# Patient Record
Sex: FEMALE | Race: WHITE | NOT HISPANIC OR LATINO | URBAN - METROPOLITAN AREA
[De-identification: names, ages, dates, MRNs, and addresses within clinical notes are randomized per-mention and may not be internally consistent; named-entity substitution may affect disease eponyms.]

---

## 2022-11-28 ENCOUNTER — OFFICE (OUTPATIENT)
Dept: URBAN - METROPOLITAN AREA CLINIC 72 | Facility: CLINIC | Age: 22
End: 2022-11-28

## 2022-11-28 VITALS
WEIGHT: 133 LBS | OXYGEN SATURATION: 99 % | SYSTOLIC BLOOD PRESSURE: 120 MMHG | HEART RATE: 96 BPM | DIASTOLIC BLOOD PRESSURE: 84 MMHG | HEIGHT: 67 IN

## 2022-11-28 DIAGNOSIS — K58.1 IRRITABLE BOWEL SYNDROME WITH CONSTIPATION: ICD-10-CM

## 2022-11-28 DIAGNOSIS — R11.2 NAUSEA WITH VOMITING, UNSPECIFIED: ICD-10-CM

## 2022-11-28 DIAGNOSIS — R10.9 UNSPECIFIED ABDOMINAL PAIN: ICD-10-CM

## 2022-11-28 PROCEDURE — 99204 OFFICE O/P NEW MOD 45 MIN: CPT | Performed by: INTERNAL MEDICINE

## 2022-11-28 RX ORDER — ONDANSETRON 4 MG/1
12 TABLET, ORALLY DISINTEGRATING ORAL
Qty: 50 | Refills: 1 | Status: ACTIVE
Start: 2022-11-28

## 2022-11-28 RX ORDER — PANTOPRAZOLE SODIUM 40 MG/1
40 TABLET, DELAYED RELEASE ORAL
Qty: 90 | Refills: 3 | Status: ACTIVE
Start: 2022-11-28

## 2022-11-28 NOTE — SERVICEHPINOTES
Nancy Joseph   is seen for an initial visit today.  
br
elham   For the last year she has been having a lot of abdominal pain, diarrhea, constipation and vomiting but recently pain and vomiting is worse and there is more cosntipation then diarrhea. 
br
elham She had an upper endoscopy that was normal and biopsy of stomach and normal biopsy of small bowel. br
elham  Her pain is mostly in the lower abdomen .  br
elham
br She is on metoclopromide 1-3 tmes a day, zofran as needed and hyosyamine.  The hysyocyamine is not helping at all.  The zofran and metochlopromide helps the nausea.  
br
elham She doesn't take anything for constipation.  Her last BM was 4-5 days ago.  
br She does not smoke marijuana. Noemy nurse has reviewed and updated the medication list with the patient. I have reviewed the medication list along with the documented medical, social and family history. Pertinent details are also noted above in the HPI.
elham lizarraga

## 2022-11-28 NOTE — SERVICENOTES
Our goal is to partner with you to improve your health and well being. It is important for you to complete necessary testing and follow the instructions given to you at your clinic visit. Our office will call you within 2 weeks with results of any testing but you may also call sooner to obtain results - [phone].   If you have any questions or concerns please feel free to call us.  We take your care very seriously and we thank you for your trust!
- stool studies, if positive then colonoscopy
- we will get records from [facility] (EGD, path, US and HIDA) to scan in
- start daily miralax 1 dose a day, hold for loose stool
- , start pantoprazole 40 mg once daily 30 min before first meal of the day
- watch for signs/symptoms of tardive dyskinesia which is muscle spasm and lip smaking if you have any concerning symptoms then stop the metoclopromide
- zofran as needed for nasuea

## 2023-01-26 ENCOUNTER — OFFICE (OUTPATIENT)
Dept: URBAN - METROPOLITAN AREA CLINIC 72 | Facility: CLINIC | Age: 23
End: 2023-01-26

## 2023-01-26 VITALS
OXYGEN SATURATION: 98 % | HEIGHT: 67 IN | HEART RATE: 90 BPM | SYSTOLIC BLOOD PRESSURE: 122 MMHG | DIASTOLIC BLOOD PRESSURE: 80 MMHG | WEIGHT: 134 LBS

## 2023-01-26 DIAGNOSIS — R12 HEARTBURN: ICD-10-CM

## 2023-01-26 DIAGNOSIS — K59.00 CONSTIPATION, UNSPECIFIED: ICD-10-CM

## 2023-01-26 DIAGNOSIS — R11.2 NAUSEA WITH VOMITING, UNSPECIFIED: ICD-10-CM

## 2023-01-26 DIAGNOSIS — R10.9 UNSPECIFIED ABDOMINAL PAIN: ICD-10-CM

## 2023-01-26 PROCEDURE — 99214 OFFICE O/P EST MOD 30 MIN: CPT | Performed by: NURSE PRACTITIONER

## 2023-01-26 RX ORDER — POLYETHYLENE GLYCOL 3350, SODIUM SULFATE, SODIUM CHLORIDE, POTASSIUM CHLORIDE, ASCORBIC ACID, SODIUM ASCORBATE 140-9-5.2G
KIT ORAL
Qty: 1 | Refills: 0 | Status: ACTIVE
Start: 2023-01-26

## 2023-01-26 RX ORDER — PROMETHAZINE HYDROCHLORIDE 12.5 MG/1
TABLET ORAL
Qty: 42 | Refills: 0 | Status: ACTIVE
Start: 2023-01-26

## 2023-01-26 NOTE — SERVICENOTES
- Will schedule colonoscopy with Dr. Felipe
- Will send order over for CT, if you have not heard from radiology center by Monday let us know
- Phenergan sent in to pharmacy
- Will consider Gastric Emptying Study after colonoscopy and EGD

## 2023-01-26 NOTE — SERVICEHPINOTES
Nancy Joseph   is seen today for a follow-up visit.     1/26/2022br11/28/2022 Initial Visit with Dr. Felipe Olya Joseph is seen for an initial visit today. For the last year she has been having a lot of abdominal pain, diarrhea, constipation and vomiting but recently pain and vomiting is worse and there is more constipation then diarrhea. She had an upper endoscopy that was normal and biopsy of stomach and normal biopsy of small bowel. Her pain is mostly in the lower abdomen. She is on metoclopromide 1-3 tmes a day, zofran as needed and hyosyamine. The hysyocyamine is not helping at all. The zofran and metochlopromide helps the nausea. She doesn't take anything for constipation. Her last BM was 4-5 days ago. brShe does not smoke marijuana. brPlan 11/28/2022 brFC, labs and would consider CT vs. colonoscopy. Zofran and Pantoprazole prescribed.
br Today 1/26/2022 
br Ms. Joseph is here for a follow up with continued symptoms. She states that her vomiting and constipation have increased since her last visit. She is having to take 12mg of Zofran to help with nausea. The nausea comes out of no where and not related to eating. When she has vomiting, she will have several episodes of vomiting but then the nausea will eventually resolve after vomiting. Her appetite has decreased due to the nausea. 
br 
br Her abdominal pain is a little better, but when it comes it is lower across the abdomen. The abdominal pain comes in ways. She is still having constipation. She last had a bowel movement last night, but very small. Before last night it had been 4-5 days. She denies any blood or mucus in her stool. She does reports that in the past she has been blood (two episodes only) on toilet paper when she wiped. Last time this happened was over 2 weeks ago. 
br
br Currently she is not taking the reglan. She has not taken since the last visit due to possible side effects. She is taking Zofran, pantoprazole. She thinks the pantoprazole is helping some. No dysphagia. 
br
br No marijuana use and last menstrual cycle was last week.  br brLabs: br1/4/2023 Fecal Ahsan- 81 br11/28/2022 CBC, CMP, Celiac, Sed Rate, CRP- normal br

## 2023-03-13 ENCOUNTER — AMBULATORY SURGICAL CENTER (OUTPATIENT)
Dept: URBAN - METROPOLITAN AREA SURGERY 19 | Facility: SURGERY | Age: 23
End: 2023-03-13

## 2023-03-13 ENCOUNTER — OFFICE (OUTPATIENT)
Dept: URBAN - METROPOLITAN AREA PATHOLOGY 24 | Facility: PATHOLOGY | Age: 23
End: 2023-03-13

## 2023-03-13 DIAGNOSIS — R19.4 CHANGE IN BOWEL HABIT: ICD-10-CM

## 2023-03-13 DIAGNOSIS — K62.89 OTHER SPECIFIED DISEASES OF ANUS AND RECTUM: ICD-10-CM

## 2023-03-13 LAB
COLONOSCOPY STUDY: (no result)
COLONOSCOPY STUDY: (no result)

## 2023-03-13 PROCEDURE — 88305 TISSUE EXAM BY PATHOLOGIST: CPT | Performed by: PATHOLOGY

## 2023-03-13 PROCEDURE — 45380 COLONOSCOPY AND BIOPSY: CPT | Performed by: INTERNAL MEDICINE

## 2023-04-06 ENCOUNTER — OFFICE (OUTPATIENT)
Dept: URBAN - METROPOLITAN AREA CLINIC 72 | Facility: CLINIC | Age: 23
End: 2023-04-06

## 2023-04-06 VITALS
SYSTOLIC BLOOD PRESSURE: 102 MMHG | HEIGHT: 67 IN | OXYGEN SATURATION: 95 % | HEART RATE: 74 BPM | WEIGHT: 136 LBS | DIASTOLIC BLOOD PRESSURE: 80 MMHG

## 2023-04-06 DIAGNOSIS — R10.9 UNSPECIFIED ABDOMINAL PAIN: ICD-10-CM

## 2023-04-06 DIAGNOSIS — R12 HEARTBURN: ICD-10-CM

## 2023-04-06 DIAGNOSIS — R11.2 NAUSEA WITH VOMITING, UNSPECIFIED: ICD-10-CM

## 2023-04-06 DIAGNOSIS — K59.00 CONSTIPATION, UNSPECIFIED: ICD-10-CM

## 2023-04-06 PROCEDURE — 99214 OFFICE O/P EST MOD 30 MIN: CPT | Performed by: NURSE PRACTITIONER

## 2023-04-06 RX ORDER — LINACLOTIDE 72 UG/1
CAPSULE, GELATIN COATED ORAL
Qty: 30 | Refills: 0 | Status: ACTIVE

## 2023-04-06 NOTE — SERVICEHPINOTES
Nancy Joseph   is seen today for a follow-up visit.     
11/28/2022 Initial Visit with Dr. Felipe Olya Joseph is seen for an initial visit today. For the last year she has been having a lot of abdominal pain, diarrhea, constipation and vomiting but recently pain and vomiting is worse and there is more constipation then diarrhea. She had an upper endoscopy that was normal and biopsy of stomach and normal biopsy of small bowel. Her pain is mostly in the lower abdomen. She is on metoclopromide 1-3 tmes a day, zofran as needed and hyosyamine. The hysyocyamine is not helping at all. The zofran and metochlopromide helps the nausea. She doesn't take anything for constipation. Her last BM was 4-5 days ago. brShe does not smoke marijuana. br11/28/2022 Plan: brFC, labs and would consider CT vs. colonoscopy. Zofran and Pantoprazole prescribed. br1/26/2022 Interval History: Vale Joseph is here for a follow up with continued symptoms. She states that her vomiting and constipation have increased since her last visit. She is having to take 12mg of Zofran to help with nausea. The nausea comes out of no where and not related to eating. When she has vomiting, she will have several episodes of vomiting but then the nausea will eventually resolve after vomiting. Her appetite has decreased due to the nausea. Her abdominal pain is a little better, but when it comes it is lower across the abdomen. The abdominal pain comes in ways. She is still having constipation. She last had a bowel movement last night, but very small. Before last night it had been 4-5 days. She denies any blood or mucus in her stool. She does reports that in the past she has been blood (two episodes only) on toilet paper when she wiped. Last time this happened was over 2 weeks ago. Currently she is not taking the reglan. She has not taken since the last visit due to possible side effects. She is taking Zofran, pantoprazole. She thinks the pantoprazole is helping some. No dysphagia. No marijuana use and last menstrual cycle was last week.
1/26/2023 Plan: br1. Nausea and Vomiting- will try Phenergan for nausea. Discussed with pt [facility], but she would like to wait on that as she is nervous about taking Reglan due to potential side effects. Ordering CT today as well. br2. Abdominal Pain and Constipation- will schedule Colonoscopy due to continued abdominal pain, constipation, borderline fecal luis. Will also order CT of abdomen and pelvis today. br3. Heartburn- continue Pantoprazole as it has helped. brToday 4/6/2023 
br 
br Ms. Joseph is here for follow up and review Colonoscopy results.  
br
br She is still having constipation with a bowel movement every 5-6 days. No blood or mucus in stool. She also is having some abdominal cramping in lower abdomen. The cramping is occasional. She did have N/V once on Saturday, but none since then. 
br
br No significant weight loss or fever. She does have chills when she get nauseated.  
br
br Reflux is controlled on Pantoprazole once day. brLabs: br1/4/2023 Fecal Luis- 81 br11/28/2022 CBC, CMP, Celiac, Sed Rate, CRP- normal Imaging br2/13/2023 CT Abd/Pelvis with contrast- normal br10/22 US normal, HIDA normalProcedures br3/13/2023 Colonoscopy with Dr. Felipe- mild melanosis (Dr. Felipe recommended Linzess and Fiber)br10/22 EGD normal with normal biopsy of stomach and for celiac.

## 2023-04-06 NOTE — SERVICENOTES
- Labs today and we will call with results
- Increase fiber (Metamucil) small dose
- Linzess once a day in morning before eat
- RTC in 6 months for follow up, sooner if needed
- PCP recommendation [facility]- [phone]
- GYN recommendation [facility] in [place] [phone]